# Patient Record
Sex: MALE | Race: WHITE | NOT HISPANIC OR LATINO | Employment: FULL TIME | URBAN - METROPOLITAN AREA
[De-identification: names, ages, dates, MRNs, and addresses within clinical notes are randomized per-mention and may not be internally consistent; named-entity substitution may affect disease eponyms.]

---

## 2019-08-21 ENCOUNTER — OFFICE VISIT (OUTPATIENT)
Dept: FAMILY MEDICINE | Facility: CLINIC | Age: 29
End: 2019-08-21
Payer: COMMERCIAL

## 2019-08-21 VITALS
WEIGHT: 196 LBS | HEIGHT: 72 IN | SYSTOLIC BLOOD PRESSURE: 120 MMHG | HEART RATE: 68 BPM | BODY MASS INDEX: 26.55 KG/M2 | OXYGEN SATURATION: 99 % | DIASTOLIC BLOOD PRESSURE: 70 MMHG | TEMPERATURE: 96 F

## 2019-08-21 DIAGNOSIS — J20.9 ACUTE BRONCHITIS WITH SYMPTOMS > 10 DAYS: Primary | ICD-10-CM

## 2019-08-21 PROCEDURE — 99203 OFFICE O/P NEW LOW 30 MIN: CPT | Performed by: NURSE PRACTITIONER

## 2019-08-21 RX ORDER — AZITHROMYCIN 250 MG/1
TABLET, FILM COATED ORAL
Qty: 6 TABLET | Refills: 0 | Status: SHIPPED | OUTPATIENT
Start: 2019-08-21 | End: 2019-08-26

## 2019-08-21 ASSESSMENT — MIFFLIN-ST. JEOR: SCORE: 1907.05

## 2019-08-21 NOTE — PROGRESS NOTES
Subjective     Guicho Baird is a 28 year old male who presents to clinic today for the following health issues:    HPI   Acute Illness   Acute illness concerns: cough, congestion   Onset:  3 weeks     Fever: YES- reported unknown     Chills/Sweats: YES- one day     Headache (location?): no    Sinus Pressure:no    Conjunctivitis:  no    Ear Pain: no    Rhinorrhea: YES    Congestion: YES    Sore Throat: no     Cough: YES-productive of green sputum    Wheeze: YES    Decreased Appetite: no    Nausea: no    Vomiting: no    Diarrhea:  no    Dysuria/Freq.: no    Fatigue/Achiness: no    Sick/Strep Exposure: no     Therapies Tried and outcome: sudafed, tylenol, and a decongestant - somewhat helpful     HPI: Guicho presents today with the complaint of ongoing productive cough (3.5 weeks). Intermittent fevers over 36 hours. Had gotten better for a few days, but, yesterday, his symptoms returned and intensified. Endorses productive cough (green sputum) and mild wheeze. Denies body aches, shortness of breath, nausea, vomiting, diarrhea, rash. OTC remedies not helpful.     There is no problem list on file for this patient.    History reviewed. No pertinent surgical history.    Social History     Tobacco Use     Smoking status: Never Smoker     Smokeless tobacco: Never Used   Substance Use Topics     Alcohol use: Yes     Family History   Problem Relation Age of Onset     Kawasaki disease Sister      Arrhythmia Brother      Dementia Paternal Grandmother      Cancer Paternal Grandfather            Reviewed and updated as needed this visit by Provider  Tobacco  Med Hx  Surg Hx  Fam Hx  Soc Hx        Review of Systems   ROS COMP: Constitutional, HEENT, pulmonary, GI, musculoskeletal, skin systems are negative, except as otherwise noted.      Objective    /70 (BP Location: Left arm, Patient Position: Chair, Cuff Size: Adult Regular)   Pulse 68   Temp 96  F (35.6  C) (Tympanic)   Ht 1.829 m (6')   Wt 88.9 kg (196  lb)   SpO2 99%   BMI 26.58 kg/m    Body mass index is 26.58 kg/m .  Physical Exam   GENERAL: healthy, alert and no distress  HENT: ear canals and TM's normal, nose and mouth without ulcers or lesions  NECK: no adenopathy, no asymmetry, masses, or scars and thyroid normal to palpation  RESP: Moist cough; Coarse rhonchi noted throughout (cleared with cough); No evidence of focal consolidation; Chest excursion, respiratory rate, and ventilatory effort / ease within normal limits. No indicators of respiratory distress.  CV: regular rate and rhythm, normal S1 S2, no S3 or S4, no murmur, click or rub, no peripheral edema and peripheral pulses strong  NEURO: Normal strength and tone, mentation intact and speech normal    Diagnostic Test Results:  none         Assessment & Plan     Guicho was seen today for cough.    Diagnoses and all orders for this visit:    Acute bronchitis with symptoms > 10 days  Comment: Reasonable to treat given duration of symptoms, double sickening pattern, and productive nature of cough (green sputum). Azithromycin for 5 days. Symptomatic cares and follow up precautions discussed in detail. Guicho acknowledges and demonstrates understanding of circumstances under which care should be sought urgently or emergently. Follow up as discussed. Discussed risks, benefits, alternatives, potential side effects, and proper administration of new medication / treatment. Agrees with plan of care. All questions answered.     -     azithromycin (ZITHROMAX) 250 MG tablet; Take 2 tablets (500 mg) by mouth daily for 1 day, THEN 1 tablet (250 mg) daily for 4 days.         BMI:   Estimated body mass index is 26.58 kg/m  as calculated from the following:    Height as of this encounter: 1.829 m (6').    Weight as of this encounter: 88.9 kg (196 lb).   Weight management plan: Discussed healthy diet and exercise guidelines        See Patient Instructions    Return in about 1 week (around 8/28/2019) for persistent or  worsening symptoms.    Bulmaro Marshall NP  [facility] [place]

## 2019-08-21 NOTE — PATIENT INSTRUCTIONS
RESPIRATORY INFECTION SUPPORTIVE CARES:  Stay hydrated (even if you're not thirsty)  Over-the-counter decongestants (phenylephrine or Sudafed) for sinus and nasal congestion  Guaifenesin (Mucinex) to thin secretions  Warm compresses over sinuses, or allow warm shower water to run down face  Neti-Pot sinus / nasal rinse  Flonase or other intranasal steroid MAY help  Humidifier at night  Tylenol or ibuprofen for fever and aches & pains  Salt water gargles, Chloraseptic spray, or Cepacol lozenges for sore throat  Over-the-counter cough suppressants, cough drops, or honey for cough  Call or return to the clinic if you are getting worse or not getting better in the coming days  Go to the Emergency Room if you have trouble breathing or if your fever gets really high

## 2021-05-31 ENCOUNTER — RECORDS - HEALTHEAST (OUTPATIENT)
Dept: ADMINISTRATIVE | Facility: CLINIC | Age: 31
End: 2021-05-31

## 2022-11-29 ENCOUNTER — OFFICE VISIT (OUTPATIENT)
Dept: FAMILY MEDICINE | Facility: CLINIC | Age: 32
End: 2022-11-29
Payer: COMMERCIAL

## 2022-11-29 VITALS
SYSTOLIC BLOOD PRESSURE: 130 MMHG | BODY MASS INDEX: 32.1 KG/M2 | HEART RATE: 81 BPM | TEMPERATURE: 97 F | OXYGEN SATURATION: 100 % | HEIGHT: 72 IN | WEIGHT: 237 LBS | DIASTOLIC BLOOD PRESSURE: 86 MMHG

## 2022-11-29 DIAGNOSIS — E66.811 CLASS 1 OBESITY DUE TO EXCESS CALORIES WITHOUT SERIOUS COMORBIDITY WITH BODY MASS INDEX (BMI) OF 32.0 TO 32.9 IN ADULT: ICD-10-CM

## 2022-11-29 DIAGNOSIS — Z13.29 SCREENING FOR THYROID DISORDER: ICD-10-CM

## 2022-11-29 DIAGNOSIS — E66.09 CLASS 1 OBESITY DUE TO EXCESS CALORIES WITHOUT SERIOUS COMORBIDITY WITH BODY MASS INDEX (BMI) OF 32.0 TO 32.9 IN ADULT: ICD-10-CM

## 2022-11-29 DIAGNOSIS — Z13.220 SCREENING FOR HYPERLIPIDEMIA: ICD-10-CM

## 2022-11-29 DIAGNOSIS — Z00.00 ROUTINE GENERAL MEDICAL EXAMINATION AT A HEALTH CARE FACILITY: Primary | ICD-10-CM

## 2022-11-29 DIAGNOSIS — Z13.1 SCREENING FOR DIABETES MELLITUS: ICD-10-CM

## 2022-11-29 DIAGNOSIS — Z13.0 SCREENING FOR DEFICIENCY ANEMIA: ICD-10-CM

## 2022-11-29 LAB
ALBUMIN SERPL BCG-MCNC: 4.6 G/DL (ref 3.5–5.2)
ALP SERPL-CCNC: 55 U/L (ref 40–129)
ALT SERPL W P-5'-P-CCNC: 53 U/L (ref 10–50)
ANION GAP SERPL CALCULATED.3IONS-SCNC: 14 MMOL/L (ref 7–15)
AST SERPL W P-5'-P-CCNC: 46 U/L (ref 10–50)
BILIRUB SERPL-MCNC: 0.6 MG/DL
BUN SERPL-MCNC: 12.9 MG/DL (ref 6–20)
CALCIUM SERPL-MCNC: 9 MG/DL (ref 8.6–10)
CHLORIDE SERPL-SCNC: 102 MMOL/L (ref 98–107)
CHOLEST SERPL-MCNC: 224 MG/DL
CREAT SERPL-MCNC: 1.06 MG/DL (ref 0.67–1.17)
DEPRECATED HCO3 PLAS-SCNC: 25 MMOL/L (ref 22–29)
ERYTHROCYTE [DISTWIDTH] IN BLOOD BY AUTOMATED COUNT: 12.7 % (ref 10–15)
GFR SERPL CREATININE-BSD FRML MDRD: >90 ML/MIN/1.73M2
GLUCOSE SERPL-MCNC: 91 MG/DL (ref 70–99)
HCT VFR BLD AUTO: 43.7 % (ref 40–53)
HDLC SERPL-MCNC: 48 MG/DL
HGB BLD-MCNC: 15.3 G/DL (ref 13.3–17.7)
LDLC SERPL CALC-MCNC: 138 MG/DL
MCH RBC QN AUTO: 29.5 PG (ref 26.5–33)
MCHC RBC AUTO-ENTMCNC: 35 G/DL (ref 31.5–36.5)
MCV RBC AUTO: 84 FL (ref 78–100)
NONHDLC SERPL-MCNC: 176 MG/DL
PLATELET # BLD AUTO: 189 10E3/UL (ref 150–450)
POTASSIUM SERPL-SCNC: 4 MMOL/L (ref 3.4–5.3)
PROT SERPL-MCNC: 7.2 G/DL (ref 6.4–8.3)
RBC # BLD AUTO: 5.18 10E6/UL (ref 4.4–5.9)
SODIUM SERPL-SCNC: 141 MMOL/L (ref 136–145)
TRIGL SERPL-MCNC: 188 MG/DL
WBC # BLD AUTO: 4.8 10E3/UL (ref 4–11)

## 2022-11-29 PROCEDURE — 36415 COLL VENOUS BLD VENIPUNCTURE: CPT | Performed by: NURSE PRACTITIONER

## 2022-11-29 PROCEDURE — 84443 ASSAY THYROID STIM HORMONE: CPT | Performed by: NURSE PRACTITIONER

## 2022-11-29 PROCEDURE — 80061 LIPID PANEL: CPT | Performed by: NURSE PRACTITIONER

## 2022-11-29 PROCEDURE — 0124A COVID-19 VACCINE BIVALENT BOOSTER 12+ (PFIZER): CPT | Performed by: NURSE PRACTITIONER

## 2022-11-29 PROCEDURE — 85027 COMPLETE CBC AUTOMATED: CPT | Performed by: NURSE PRACTITIONER

## 2022-11-29 PROCEDURE — 91312 COVID-19 VACCINE BIVALENT BOOSTER 12+ (PFIZER): CPT | Performed by: NURSE PRACTITIONER

## 2022-11-29 PROCEDURE — 80053 COMPREHEN METABOLIC PANEL: CPT | Performed by: NURSE PRACTITIONER

## 2022-11-29 PROCEDURE — 99385 PREV VISIT NEW AGE 18-39: CPT | Performed by: NURSE PRACTITIONER

## 2022-11-29 ASSESSMENT — ENCOUNTER SYMPTOMS
DYSURIA: 0
MYALGIAS: 0
NERVOUS/ANXIOUS: 0
CONSTIPATION: 0
NAUSEA: 0
ARTHRALGIAS: 0
JOINT SWELLING: 0
CHILLS: 0
SHORTNESS OF BREATH: 0
SORE THROAT: 0
DIZZINESS: 0
HEADACHES: 0
HEMATURIA: 0
WEAKNESS: 0
HEMATOCHEZIA: 0
DIARRHEA: 0
ABDOMINAL PAIN: 0
FEVER: 0
HEARTBURN: 0
COUGH: 0
PALPITATIONS: 0
EYE PAIN: 0
FREQUENCY: 0
PARESTHESIAS: 0

## 2022-11-29 NOTE — PROGRESS NOTES
SUBJECTIVE:   CC: Sanjeev is an 32 year old who presents for preventative health visit.     Patient has been advised of split billing requirements and indicates understanding: Yes  Healthy Habits:     Getting at least 3 servings of Calcium per day:  Yes    Bi-annual eye exam:  Yes    Dental care twice a year:  Yes    Sleep apnea or symptoms of sleep apnea:  None    Diet:  Regular (no restrictions) and Breakfast skipped    Frequency of exercise:  2-3 days/week    Duration of exercise:  30-45 minutes    Taking medications regularly:  Yes    Medication side effects:  None    PHQ-2 Total Score: 0    Additional concerns today:  Yes    Had lab/ biometric screening at his work.  Previously weighed 270 pounds in college. Worked out and was able to get weight down to 190 pounds.    Recently  this year reports decrease in his physical activity and some overeating.  Due to BMI 32 and  he was denied money from his work towards his insurance.  He has paperwork with him today to appeal this to help him get more financial support from his work towards his insurance.    Has an overall goal of healthier eating and weight loss.  Does report intermittent fasting tries to eat last meal before 8 PM and not eat until noonish next day.  Labs need repeated today.     Wt Readings from Last 5 Encounters:   11/29/22 107.5 kg (237 lb)   08/21/19 88.9 kg (196 lb)     Today's PHQ-2 Score:   PHQ-2 ( 1999 Pf[employer]) 11/29/2022   Q1: Little interest or pleasure in doing things 0   Q2: Feeling down, depressed or hopeless 0   PHQ-2 Score 0   PHQ-2 Total Score (12-17 Years)- Positive if 3 or more points; Administer PHQ-A if positive -   Q1: Little interest or pleasure in doing things Not at all   Q2: Feeling down, depressed or hopeless Not at all   PHQ-2 Score 0     Have you ever done Advance Care Planning? (For example, a Health Directive, POLST, or a discussion with a medical provider or your loved ones about your wishes): No, advance care  planning information given to patient to review.  Patient plans to discuss their wishes with loved ones or provider.      Social History     Tobacco Use     Smoking status: Never     Smokeless tobacco: Never   Substance Use Topics     Alcohol use: Yes     Alcohol Use 11/29/2022   Prescreen: >3 drinks/day or >7 drinks/week? No     Last PSA: No results found for: PSA    Reviewed orders with patient. Reviewed health maintenance and updated orders accordingly - Yes  Lab work is in process  Labs reviewed in EPIC  BP Readings from Last 3 Encounters:   11/29/22 130/86   08/21/19 120/70    Wt Readings from Last 3 Encounters:   11/29/22 107.5 kg (237 lb)   08/21/19 88.9 kg (196 lb)           There is no problem list on file for this patient.    History reviewed. No pertinent surgical history.    Social History     Tobacco Use     Smoking status: Never     Smokeless tobacco: Never   Substance Use Topics     Alcohol use: Yes     Family History   Problem Relation Age of Onset     No Known Problems Mother      Hyperlipidemia Father      Hypertension Father      Kawasaki disease Sister      Hypertension Brother      Arrhythmia Brother      Heart Defect Brother      Pacemaker Brother      No Known Problems Brother      Cancer Maternal Grandfather      Dementia Paternal Grandmother      Cancer Paternal Grandfather          No current outpatient medications on file.     No Known Allergies    Reviewed and updated as needed this visit by clinical staff   Tobacco  Allergies  Meds  Problems  Med Hx  Surg Hx  Fam Hx          Reviewed and updated as needed this visit by Provider   Tobacco  Allergies  Meds  Problems  Med Hx  Surg Hx  Fam Hx           Review of Systems   Constitutional: Negative for chills and fever.   HENT: Negative for congestion, ear pain, hearing loss and sore throat.    Eyes: Negative for pain and visual disturbance.   Respiratory: Negative for cough and shortness of breath.    Cardiovascular: Negative  for chest pain, palpitations and peripheral edema.   Gastrointestinal: Negative for abdominal pain, constipation, diarrhea, heartburn, hematochezia and nausea.   Genitourinary: Negative for dysuria, frequency, genital sores, hematuria, impotence, penile discharge and urgency.   Musculoskeletal: Negative for arthralgias, joint swelling and myalgias.   Skin: Negative for rash.   Neurological: Negative for dizziness, weakness, headaches and paresthesias.   Psychiatric/Behavioral: Negative for mood changes. The patient is not nervous/anxious.      OBJECTIVE:   /86   Pulse 81   Temp 97  F (36.1  C)   Ht 1.829 m (6')   Wt 107.5 kg (237 lb)   SpO2 100%   BMI 32.14 kg/m    Waist 42 inches.    Physical Exam  GENERAL: healthy, alert and no distress  EYES: Eyes grossly normal to inspection, PERRL and conjunctivae and sclerae normal  HENT: ear canals and TM's normal, nose and mouth without ulcers or lesions  NECK: no adenopathy, no asymmetry, masses, or scars and thyroid normal to palpation  RESP: lungs clear to auscultation - no rales, rhonchi or wheezes  CV: regular rate and rhythm, normal S1 S2, no S3 or S4, no murmur, click or rub, no peripheral edema and peripheral pulses strong  ABDOMEN: soft, nontender, no hepatosplenomegaly, no masses and bowel sounds normal  MS: no gross musculoskeletal defects noted, no edema  SKIN: no suspicious lesions or rashes  NEURO: Normal strength and tone, mentation intact and speech normal  PSYCH: mentation appears normal, affect normal/bright    ASSESSMENT/PLAN:   Guicho was seen today for physical.    Diagnoses and all orders for this visit:    Routine general medical examination at a health care facility  Wellness exam completed today.    Fasting labs today.    Will notify of lab results.  -     REVIEW OF HEALTH MAINTENANCE PROTOCOL ORDERS    Class 1 obesity due to excess calories without serious comorbidity with body mass index (BMI) of 32.0 to 32.9 in adult  He has  realistic goals for his health and weight loss.  Will complete labs today.  Will work on weight loss throughout the year encourage follow-up in approximately 6 months for repeat weight check and waist measurement.  Happy to appeal his work for better insurance coverage to help him to achieve his goals to improving his health.    Screening for deficiency anemia  -     CBC with platelets; Future  -     CBC with platelets    Screening for thyroid disorder  -     Comprehensive metabolic panel (BMP + Alb, Alk Phos, ALT, AST, Total. Bili, TP); Future  -     TSH with free T4 reflex; Future  -     Comprehensive metabolic panel (BMP + Alb, Alk Phos, ALT, AST, Total. Bili, TP)  -     TSH with free T4 reflex    Screening for diabetes mellitus  -     Comprehensive metabolic panel (BMP + Alb, Alk Phos, ALT, AST, Total. Bili, TP); Future  -     Comprehensive metabolic panel (BMP + Alb, Alk Phos, ALT, AST, Total. Bili, TP)    Screening for hyperlipidemia  -     Lipid panel reflex to direct LDL Fasting; Future  -     Lipid panel reflex to direct LDL Fasting    Other orders  -     COVID-19,PF,PFIZER BOOSTER BIVALENT (12+YRS)      Patient has been advised of split billing requirements and indicates understanding: Yes      COUNSELING:   Reviewed preventive health counseling, as reflected in patient instructions    He reports that he has never smoked. He has never used smokeless tobacco.              CHAS Goodwin CNP  [facility] [place]

## 2022-11-30 LAB — TSH SERPL DL<=0.005 MIU/L-ACNC: 1.78 UIU/ML (ref 0.3–4.2)

## 2022-12-07 NOTE — PROGRESS NOTES
Form has been faxed and copied and sent to scan.  Mailed original to pt.    Informed pt this info.    Cornel SOUZA CMA

## 2022-12-07 NOTE — PROGRESS NOTES
[employer] appeal form completed please fax to  number provided on the form.      Please also mail patient a copy and scanned in his chart.  Please, make him aware form has been completed.        Calista Olivares, GREGP-BC

## 2023-02-06 ENCOUNTER — OFFICE VISIT (OUTPATIENT)
Dept: FAMILY MEDICINE | Facility: CLINIC | Age: 33
End: 2023-02-06
Payer: COMMERCIAL

## 2023-02-06 DIAGNOSIS — E66.811 CLASS 1 OBESITY DUE TO EXCESS CALORIES WITHOUT SERIOUS COMORBIDITY WITH BODY MASS INDEX (BMI) OF 32.0 TO 32.9 IN ADULT: Primary | ICD-10-CM

## 2023-02-06 DIAGNOSIS — E66.09 CLASS 1 OBESITY DUE TO EXCESS CALORIES WITHOUT SERIOUS COMORBIDITY WITH BODY MASS INDEX (BMI) OF 32.0 TO 32.9 IN ADULT: Primary | ICD-10-CM

## 2023-02-06 PROCEDURE — 99207 PR NO CHARGE LOS: CPT | Performed by: NURSE PRACTITIONER

## 2023-02-06 NOTE — PROGRESS NOTES
This was a weight check only not needed to be a full visit.  He is to being a specific wait for insurance to cover him better on his insurance premium.  Has done well and lost almost 10 pounds.  They need him to be at that 10 pound valencia.  He is 2 pounds shy of this today.  Encouraged him to return on Friday for nurse only weight check and I can fill out his paperwork for him.        No charge for today's visit.        Calista Olivares, FNP-BC

## 2023-02-10 ENCOUNTER — ALLIED HEALTH/NURSE VISIT (OUTPATIENT)
Dept: FAMILY MEDICINE | Facility: CLINIC | Age: 33
End: 2023-02-10
Payer: COMMERCIAL

## 2023-02-10 ENCOUNTER — TELEPHONE (OUTPATIENT)
Dept: FAMILY MEDICINE | Facility: CLINIC | Age: 33
End: 2023-02-10

## 2023-02-10 VITALS — WEIGHT: 226 LBS | BODY MASS INDEX: 30.65 KG/M2

## 2023-02-10 DIAGNOSIS — R63.4 WEIGHT LOSS: Primary | ICD-10-CM

## 2023-02-10 PROCEDURE — 99207 PR NO CHARGE NURSE ONLY: CPT

## 2023-02-10 NOTE — TELEPHONE ENCOUNTER
Copy of letter sent to patient's MyChart.  Copy printed with his new weight to be faxed to [employer] appeal committee.        Calista Olivares, GREGP-BC

## 2023-02-10 NOTE — TELEPHONE ENCOUNTER
Reason for Call:  Form, our goal is to have forms completed with 72 hours, however, some forms may require a visit or additional information.    Type of letter, form or note:  [employer] Appeal Form    Who is the form from?: Healthcare Provider - [employer] (if other please explain)    Where did the form come from: Patient or family brought in       What clinic location was the form placed at?: [facility]    Where the form was placed: Given to physician - in office in folder    What number is listed as a contact on the form?: [phone] cell       Additional comments: call pt when letter and form has been faxed  FAX TO - [phone] --- send to scan after    Call taken on 2/10/2023 at 8:50 AM by Cornel Montenegro CMA

## 2023-02-10 NOTE — LETTER
February 10, 2023      Guicho Baird  [id number] [place] [address]    To Whom It May Concern:    Guicho Baird was seen in our clinic 2/10/23. He has a current weight of 226 pounds which is down 11 pounds. He has done an excellent job working on his goal of weight loss.     Wt Readings from Last 5 Encounters:   02/10/23 102.5 kg (226 lb)   11/29/22 107.5 kg (237 lb)   08/21/19 88.9 kg (196 lb)       Sincerely,          CHAS Goodwin CNP

## 2023-03-22 NOTE — TELEPHONE ENCOUNTER
Pt called and stated that [employer] did not ever receive that documentation required. Pt is requesting that the letter & the weight documentation be re-faxed and printed for the pt and mailed to address on file.    Pt call back: [phone] Detailed [employer]m EDITH Flanagan

## 2023-03-22 NOTE — TELEPHONE ENCOUNTER
Printed, faxed ([phone]) and copy of letter mailed to address on file. Jessica Galindo CMA

## 2023-11-30 ASSESSMENT — ENCOUNTER SYMPTOMS
NAUSEA: 0
DIZZINESS: 0
ABDOMINAL PAIN: 0
CONSTIPATION: 0
CHILLS: 0
COUGH: 0
MYALGIAS: 0
FEVER: 0
HEMATOCHEZIA: 0
DYSURIA: 0
DIARRHEA: 0
FREQUENCY: 0
WEAKNESS: 0
PALPITATIONS: 0
PARESTHESIAS: 0
ARTHRALGIAS: 0
HEARTBURN: 0
SHORTNESS OF BREATH: 0
HEMATURIA: 0
JOINT SWELLING: 0
HEADACHES: 0
SORE THROAT: 0
EYE PAIN: 0
NERVOUS/ANXIOUS: 0

## 2023-12-01 ENCOUNTER — OFFICE VISIT (OUTPATIENT)
Dept: FAMILY MEDICINE | Facility: CLINIC | Age: 33
End: 2023-12-01
Payer: COMMERCIAL

## 2023-12-01 VITALS
TEMPERATURE: 97.3 F | WEIGHT: 227 LBS | BODY MASS INDEX: 30.75 KG/M2 | OXYGEN SATURATION: 100 % | HEART RATE: 71 BPM | SYSTOLIC BLOOD PRESSURE: 127 MMHG | HEIGHT: 72 IN | RESPIRATION RATE: 11 BRPM | DIASTOLIC BLOOD PRESSURE: 70 MMHG

## 2023-12-01 DIAGNOSIS — Z00.00 ROUTINE GENERAL MEDICAL EXAMINATION AT A HEALTH CARE FACILITY: Primary | ICD-10-CM

## 2023-12-01 DIAGNOSIS — E78.5 HYPERLIPIDEMIA WITH TARGET LDL LESS THAN 160: ICD-10-CM

## 2023-12-01 PROCEDURE — 36415 COLL VENOUS BLD VENIPUNCTURE: CPT | Performed by: NURSE PRACTITIONER

## 2023-12-01 PROCEDURE — 80061 LIPID PANEL: CPT | Performed by: NURSE PRACTITIONER

## 2023-12-01 PROCEDURE — 84443 ASSAY THYROID STIM HORMONE: CPT | Performed by: NURSE PRACTITIONER

## 2023-12-01 PROCEDURE — 99395 PREV VISIT EST AGE 18-39: CPT | Performed by: NURSE PRACTITIONER

## 2023-12-01 ASSESSMENT — ENCOUNTER SYMPTOMS
ARTHRALGIAS: 0
HEADACHES: 0
CHILLS: 0
CONSTIPATION: 0
FEVER: 0
HEMATURIA: 0
JOINT SWELLING: 0
COUGH: 0
SHORTNESS OF BREATH: 0
HEARTBURN: 0
DIARRHEA: 0
EYE PAIN: 0
FREQUENCY: 0
SORE THROAT: 0
HEMATOCHEZIA: 0
NERVOUS/ANXIOUS: 0
NAUSEA: 0
MYALGIAS: 0
PARESTHESIAS: 0
PALPITATIONS: 0
DYSURIA: 0
ABDOMINAL PAIN: 0
DIZZINESS: 0
WEAKNESS: 0

## 2023-12-01 NOTE — PROGRESS NOTES
SUBJECTIVE:   Sanjeev is a 33 year old, presenting for the following:  Physical        12/1/2023    10:47 AM   Additional Questions   Roomed by Cornel CMA   Accompanied by Self       Healthy Habits:     Getting at least 3 servings of Calcium per day:  Yes    Bi-annual eye exam:  NO    Dental care twice a year:  Yes    Sleep apnea or symptoms of sleep apnea:  None    Diet:  Breakfast skipped    Frequency of exercise:  4-5 days/week    Duration of exercise:  30-45 minutes    Taking medications regularly:  Yes    Medication side effects:  Not applicable and None    Additional concerns today:  No    Biometric screening form to complete after lab results    Triglycerides.  LDL     Fish oil  Recheck annuallly.     Wt Readings from Last 5 Encounters:   12/01/23 103 kg (227 lb)   02/10/23 102.5 kg (226 lb)   11/29/22 107.5 kg (237 lb)   08/21/19 88.9 kg (196 lb)            Social History     Tobacco Use     Smoking status: Never     Smokeless tobacco: Never   Substance Use Topics     Alcohol use: Yes         11/30/2023     7:17 PM   Alcohol Use   Prescreen: >3 drinks/day or >7 drinks/week? Yes   AUDIT SCORE  7         11/30/2023     7:17 PM   AUDIT - Alcohol Use Disorders Identification Test - Reproduced from the World Health Organization Audit 2001 (Second Edition)   1.  How often do you have a drink containing alcohol? 2 to 3 times a week   2.  How many drinks containing alcohol do you have on a typical day when you are drinking? 3 or 4   3.  How often do you have five or more drinks on one occasion? Less than monthly   4.  How often during the last year have you found that you were not able to stop drinking once you had started? Never   5.  How often during the last year have you failed to do what was normally expected of you because of drinking? Never   6.  How often during the last year have you needed a first drink in the morning to get yourself going after a heavy drinking session? Never   7.  How often during the last  "year have you had a feeling of guilt or remorse after drinking? Less than monthly   8.  How often during the last year have you been unable to remember what happened the night before because of your drinking? Less than monthly   9.  Have you or someone else been injured because of your drinking? No   10. Has a relative, friend, doctor or other health care worker been concerned about your drinking or suggested you cut down? No   TOTAL SCORE 7       Last PSA: No results found for: \"PSA\"    Reviewed orders with patient. Reviewed health maintenance and updated orders accordingly - Yes  Lab work is in process  Labs reviewed in EPIC  BP Readings from Last 3 Encounters:   12/01/23 127/70   11/29/22 130/86   08/21/19 120/70    Wt Readings from Last 3 Encounters:   12/01/23 103 kg (227 lb)   02/10/23 102.5 kg (226 lb)   11/29/22 107.5 kg (237 lb)                  There is no problem list on file for this patient.    History reviewed. No pertinent surgical history.    Social History     Tobacco Use     Smoking status: Never     Smokeless tobacco: Never   Substance Use Topics     Alcohol use: Yes     Family History   Problem Relation Age of Onset     No Known Problems Mother      Hyperlipidemia Father      Hypertension Father      Kawasaki disease Sister      Hypertension Brother      Arrhythmia Brother      Heart Defect Brother      Pacemaker Brother      No Known Problems Brother      Cancer Maternal Grandfather      Dementia Paternal Grandmother      Cancer Paternal Grandfather          No current outpatient medications on file.     No Known Allergies    Reviewed and updated as needed this visit by clinical staff   Tobacco  Allergies  Meds  Problems  Med Hx  Surg Hx  Fam Hx          Reviewed and updated as needed this visit by Provider   Tobacco  Allergies  Meds  Problems  Med Hx  Surg Hx  Fam Hx           Review of Systems   Constitutional:  Negative for chills and fever.   HENT:  Negative for congestion, ear " "pain, hearing loss and sore throat.    Eyes:  Negative for pain and visual disturbance.   Respiratory:  Negative for cough and shortness of breath.    Cardiovascular:  Negative for chest pain, palpitations and peripheral edema.   Gastrointestinal:  Negative for abdominal pain, constipation, diarrhea, heartburn, hematochezia and nausea.   Genitourinary:  Negative for dysuria, frequency, genital sores, hematuria, impotence, penile discharge and urgency.   Musculoskeletal:  Negative for arthralgias, joint swelling and myalgias.   Skin:  Negative for rash.   Neurological:  Negative for dizziness, weakness, headaches and paresthesias.   Psychiatric/Behavioral:  Negative for mood changes. The patient is not nervous/anxious.      [facility]        OBJECTIVE:   /70 (BP Location: Right arm, Patient Position: Chair, Cuff Size: Adult Large)   Pulse 71   Temp 97.3  F (36.3  C) (Tympanic)   Resp 11   Ht 1.84 m (6' 0.44\")   Wt 103 kg (227 lb)   SpO2 100%   BMI 30.41 kg/m      Physical Exam  GENERAL: healthy, alert and no distress  EYES: Eyes grossly normal to inspection, PERRL and conjunctivae and sclerae normal  HENT: ear canals and TM's normal, nose and mouth without ulcers or lesions  NECK: no adenopathy, no asymmetry, masses, or scars and thyroid normal to palpation  RESP: lungs clear to auscultation - no rales, rhonchi or wheezes  CV: regular rate and rhythm, normal S1 S2, no S3 or S4, no murmur, click or rub, no peripheral edema and peripheral pulses strong  ABDOMEN: soft, nontender, no hepatosplenomegaly, no masses and bowel sounds normal  MS: no gross musculoskeletal defects noted, no edema  SKIN: no suspicious lesions or rashes  NEURO: Normal strength and tone, mentation intact and speech normal  PSYCH: mentation appears normal, affect normal/bright    Diagnostic Test Results:  Labs reviewed in Epic    ASSESSMENT/PLAN:   Sanjeev was seen today for physical.    Diagnoses and all orders for this " visit:    Routine general medical examination at a health care facility  Wellness exam completed today.    Fasting labs today.    Will notify of lab results.  -     Lipid panel reflex to direct LDL Fasting; Future  -     TSH with free T4 reflex; Future  -     Lipid panel reflex to direct LDL Fasting  -     TSH with free T4 reflex    Hyperlipidemia with target LDL less than 160    Other orders  -     Cancel: COVID-19 12+ (2023-24) (PFIZER)  -     REVIEW OF HEALTH MAINTENANCE PROTOCOL ORDERS  -     PRIMARY CARE FOLLOW-UP SCHEDULING; Future        Patient has been advised of split billing requirements and indicates understanding: Yes      COUNSELING:   Reviewed preventive health counseling, as reflected in patient instructions        He reports that he has never smoked. He has never used smokeless tobacco.             CHAS Goodwin [employer] [place]

## 2023-12-02 LAB
CHOLEST SERPL-MCNC: 206 MG/DL
HDLC SERPL-MCNC: 46 MG/DL
LDLC SERPL CALC-MCNC: 129 MG/DL
NONHDLC SERPL-MCNC: 160 MG/DL
TRIGL SERPL-MCNC: 155 MG/DL
TSH SERPL DL<=0.005 MIU/L-ACNC: 1.87 UIU/ML (ref 0.3–4.2)

## 2023-12-12 NOTE — RESULT ENCOUNTER NOTE
Dear Sanjeev,    Here is a summary of your recent test results:    -LDL(bad) cholesterol level is elevated, and your triglycerides are elevated which can increase your heart disease risk.  However they are improved from last year. A diet high in fat and simple carbohydrates, genetics and being overweight can contribute to this. ADVISE: exercising eating a low saturated fat/low carbohydrate diet, and omega-3 fatty acids (fish oil) 9000-1832 mg daily are helpful to improve this.      COVID-19 Vaccine(5 - 2023-24 season) due on 09/01/2023      Please call us at [phone] (or use [facility]) to address the above recommendations if needed.    Thank you for choosing [facility].  It was an honor and a privilege to participate in your care.       Healthy regards,    Calista Olivares, ALY  [facility]

## 2024-10-15 ENCOUNTER — TRANSFERRED RECORDS (OUTPATIENT)
Dept: HEALTH INFORMATION MANAGEMENT | Facility: CLINIC | Age: 34
End: 2024-10-15

## 2024-11-30 SDOH — HEALTH STABILITY: PHYSICAL HEALTH: ON AVERAGE, HOW MANY MINUTES DO YOU ENGAGE IN EXERCISE AT THIS LEVEL?: 40 MIN

## 2024-11-30 SDOH — HEALTH STABILITY: PHYSICAL HEALTH: ON AVERAGE, HOW MANY DAYS PER WEEK DO YOU ENGAGE IN MODERATE TO STRENUOUS EXERCISE (LIKE A BRISK WALK)?: 3 DAYS

## 2024-11-30 ASSESSMENT — SOCIAL DETERMINANTS OF HEALTH (SDOH): HOW OFTEN DO YOU GET TOGETHER WITH FRIENDS OR RELATIVES?: TWICE A WEEK

## 2024-12-03 ENCOUNTER — OFFICE VISIT (OUTPATIENT)
Dept: FAMILY MEDICINE | Facility: CLINIC | Age: 34
End: 2024-12-03
Attending: NURSE PRACTITIONER
Payer: COMMERCIAL

## 2024-12-03 VITALS
HEIGHT: 73 IN | HEART RATE: 60 BPM | RESPIRATION RATE: 14 BRPM | WEIGHT: 238 LBS | BODY MASS INDEX: 31.54 KG/M2 | TEMPERATURE: 97.4 F | DIASTOLIC BLOOD PRESSURE: 72 MMHG | OXYGEN SATURATION: 96 % | SYSTOLIC BLOOD PRESSURE: 110 MMHG

## 2024-12-03 DIAGNOSIS — E78.5 HYPERLIPIDEMIA WITH TARGET LDL LESS THAN 160: ICD-10-CM

## 2024-12-03 DIAGNOSIS — E78.1 HYPERTRIGLYCERIDEMIA: ICD-10-CM

## 2024-12-03 DIAGNOSIS — Z13.29 SCREENING FOR THYROID DISORDER: ICD-10-CM

## 2024-12-03 DIAGNOSIS — E66.09 CLASS 1 OBESITY DUE TO EXCESS CALORIES WITH SERIOUS COMORBIDITY AND BODY MASS INDEX (BMI) OF 31.0 TO 31.9 IN ADULT: ICD-10-CM

## 2024-12-03 DIAGNOSIS — Z00.00 ROUTINE GENERAL MEDICAL EXAMINATION AT A HEALTH CARE FACILITY: Primary | ICD-10-CM

## 2024-12-03 DIAGNOSIS — E66.811 CLASS 1 OBESITY DUE TO EXCESS CALORIES WITH SERIOUS COMORBIDITY AND BODY MASS INDEX (BMI) OF 31.0 TO 31.9 IN ADULT: ICD-10-CM

## 2024-12-03 DIAGNOSIS — Z23 NEED FOR COVID-19 VACCINE: ICD-10-CM

## 2024-12-03 PROCEDURE — 99395 PREV VISIT EST AGE 18-39: CPT | Performed by: NURSE PRACTITIONER

## 2024-12-03 PROCEDURE — 84443 ASSAY THYROID STIM HORMONE: CPT | Performed by: NURSE PRACTITIONER

## 2024-12-03 PROCEDURE — 91320 SARSCV2 VAC 30MCG TRS-SUC IM: CPT | Performed by: NURSE PRACTITIONER

## 2024-12-03 PROCEDURE — 90480 ADMN SARSCOV2 VAC 1/ONLY CMP: CPT | Performed by: NURSE PRACTITIONER

## 2024-12-03 PROCEDURE — 36415 COLL VENOUS BLD VENIPUNCTURE: CPT | Performed by: NURSE PRACTITIONER

## 2024-12-03 RX ORDER — AMOXICILLIN 500 MG
1200 CAPSULE ORAL DAILY
COMMUNITY

## 2024-12-03 NOTE — PATIENT INSTRUCTIONS
Patient Education   Preventive Care Advice   This is general advice given by our system to help you stay healthy. However, your care team may have specific advice just for you. Please talk to your care team about your preventive care needs.  Nutrition  Eat 5 or more servings of fruits and vegetables each day.  Try wheat bread, brown rice and whole grain pasta (instead of white bread, rice, and pasta).  Get enough calcium and vitamin D. Check the label on foods and aim for 100% of the RDA (recommended daily allowance).  Lifestyle  Exercise at least 150 minutes each week  (30 minutes a day, 5 days a week).  Do muscle strengthening activities 2 days a week. These help control your weight and prevent disease.  No smoking.  Wear sunscreen to prevent skin cancer.  Have a dental exam and cleaning every 6 months.  Yearly exams  See your health care team every year to talk about:  Any changes in your health.  Any medicines your care team has prescribed.  Preventive care, family planning, and ways to prevent chronic diseases.  Shots (vaccines)   HPV shots (up to age 26), if you've never had them before.  Hepatitis B shots (up to age 59), if you've never had them before.  COVID-19 shot: Get this shot when it's due.  Flu shot: Get a flu shot every year.  Tetanus shot: Get a tetanus shot every 10 years.  Pneumococcal, hepatitis A, and RSV shots: Ask your care team if you need these based on your risk.  Shingles shot (for age 50 and up)  General health tests  Diabetes screening:  Starting at age 35, Get screened for diabetes at least every 3 years.  If you are younger than age 35, ask your care team if you should be screened for diabetes.  Cholesterol test: At age 39, start having a cholesterol test every 5 years, or more often if advised.  Bone density scan (DEXA): At age 50, ask your care team if you should have this scan for osteoporosis (brittle bones).  Hepatitis C: Get tested at least once in your life.  STIs (sexually  transmitted infections)  Before age 24: Ask your care team if you should be screened for STIs.  After age 24: Get screened for STIs if you're at risk. You are at risk for STIs (including HIV) if:  You are sexually active with more than one person.  You don't use condoms every time.  You or a partner was diagnosed with a sexually transmitted infection.  If you are at risk for HIV, ask about PrEP medicine to prevent HIV.  Get tested for HIV at least once in your life, whether you are at risk for HIV or not.  Cancer screening tests  Cervical cancer screening: If you have a cervix, begin getting regular cervical cancer screening tests starting at age 21.  Breast cancer scan (mammogram): If you've ever had breasts, begin having regular mammograms starting at age 40. This is a scan to check for breast cancer.  Colon cancer screening: It is important to start screening for colon cancer at age 45.  Have a colonoscopy test every 10 years (or more often if you're at risk) Or, ask your provider about stool tests like a FIT test every year or Cologuard test every 3 years.  To learn more about your testing options, visit:   .  For help making a decision, visit:   https://bit.ly/vo69842.  Prostate cancer screening test: If you have a prostate, ask your care team if a prostate cancer screening test (PSA) at age 55 is right for you.  Lung cancer screening: If you are a current or former smoker ages 50 to 80, ask your care team if ongoing lung cancer screenings are right for you.  For informational purposes only. Not to replace the advice of your health care provider. Copyright   2023 [facility]. All rights reserved. Clinically reviewed by the [facility]. [employer] 682159 - REV 01/24.  9 Ways to Cut Back on Drinking  Maybe you've found yourself drinking more alcohol than you'd prefer. If you want to cut back, here are some ideas to try.    Think before you drink.  Do you really want a drink,  "or is it just a habit? If you're used to having a drink at a certain time, try doing something else then.     Look for substitutes.  Find some no-alcohol drinks that you enjoy, like flavored seltzer water, tea with honey, or tonic with a slice of lime. Or try alcohol-free beer or \"virgin\" cocktails (without the alcohol).     Drink more water.  Use water to quench your thirst. Drink a glass of water before you have any alcohol. Have another glass along with every drink or between drinks.     Shrink your drink.  For example, have a bottle of beer instead of a pint. Use a smaller glass for wine. Choose drinks with lower alcohol content (ABV%). Or use less liquor and more mixer in cocktails.     Slow down.  It's easy to drink quickly and without thinking about it. Pay attention, and make each drink last longer.     Do the math.  Total up how much you spend on alcohol each month. How much is that a year? If you cut back, what could you do with the money you save?     Take a break.  Choose a day or two each week when you won't drink at all. Notice how you feel on those days, physically and emotionally. How did you sleep? Do you feel better? Over time, add more break days.     Count calories.  Would you like to lose some weight? For some people that's a good motivator for cutting back. Figure out how many calories are in each drink. How many does that add up to in a day? In a week? In a month?     Practice saying no.  Be ready when someone offers you a drink. Try: \"Thanks, I've had enough.\" Or \"Thanks, but I'm cutting back.\" Or \"No, thanks. I feel better when I drink less.\"   Current as of: November 15, 2023  Content Version: 14.2 2024 [employer].   Care instructions adapted under license by your healthcare professional. If you have questions about a medical condition or this instruction, always ask your healthcare professional. [employer] disclaims any warranty or liability for your use of " this information.

## 2024-12-03 NOTE — PROGRESS NOTES
"Preventive Care Visit  [facility]  CHAS Goodwin CNP, Nurse Practitioner - Family  Dec 3, 2024    Assessment & Plan     Routine general medical examination at a health care facility  Wellness exam completed today.    Labs done through work; they do everything except thyroid we will check that today to ensure no thyroid condition contributing to his symptoms.  Will notify of lab results.   - PRIMARY CARE FOLLOW-UP SCHEDULING    Class 1 obesity due to excess calories with serious comorbidity and body mass index (BMI) of 31.0 to 31.9 in adult  Interestingly his work form has (passing for distribution of financial incentive)of LDL if not less than 100 and BMI 24.9 or below.  Felt this is unrealistic expectations for almost anybody.  Happy to write appeal for his work.    Recommend the goal of weight loss of at least 5% in the next 3 months.  Recommend to work on your diet/Mediterranean diet and take fish oil routinely to bring down his triglycerides.    Hyperlipidemia with target LDL less than 160    - TSH WITH FREE T4 REFLEX  - TSH WITH FREE T4 REFLEX    Screening for thyroid disorder    - TSH WITH FREE T4 REFLEX  - TSH WITH FREE T4 REFLEX    Hypertriglyceridemia    - Omega-3 Fatty Acids (FISH OIL) 1200 MG capsule    Need for COVID-19 vaccine    - COVID-19 12+ (PFIZER)      BMI  Estimated body mass index is 31.78 kg/m  as calculated from the following:    Height as of this encounter: 1.843 m (6' 0.56\").    Weight as of this encounter: 108 kg (238 lb).   Weight management plan: Discussed healthy diet and exercise guidelines      Return in about 3 months (around 3/3/2025) for Recheck.     Subjective   Sanjeev is a 34 year old, presenting for the following:  Physical        12/3/2024     8:58 AM   Additional Questions   Roomed by Cornel HALL   Accompanied by Self          HPI    Needs appeal letter written - LDL and BMI was higher than they are ok with - missing out on $950.    Fish oil has not " taken in awhile.       Has a new son named Roger 3 months old at home.  Him and his wife are eating out of convenience and understandably tired with a new infant at home    Wt Readings from Last 5 Encounters:   12/03/24 108 kg (238 lb)   12/01/23 103 kg (227 lb)   02/10/23 102.5 kg (226 lb)   11/29/22 107.5 kg (237 lb)   08/21/19 88.9 kg (196 lb)       Health Care Directive  Patient does not have a Health Care Directive: Discussed advance care planning with patient; however, patient declined at this time.      11/30/2024   General Health   How would you rate your overall physical health? (!) FAIR   Feel stress (tense, anxious, or unable to sleep) Not at all            11/30/2024   Nutrition   Three or more servings of calcium each day? Yes   Diet: Regular (no restrictions)    Breakfast skipped   How many servings of fruit and vegetables per day? 4 or more   How many sweetened beverages each day? 0-1       Multiple values from one day are sorted in reverse-chronological order         11/30/2024   Exercise   Days per week of moderate/strenous exercise 3 days   Average minutes spent exercising at this level 40 min            11/30/2024   Social Factors   Frequency of gathering with friends or relatives Twice a week   Worry food won't last until get money to buy more No   Food not last or not have enough money for food? No   Do you have housing? (Housing is defined as stable permanent housing and does not include staying ouside in a car, in a tent, in an abandoned building, in an overnight shelter, or couch-surfing.) No   Are you worried about losing your housing? No   Lack of transportation? No   Unable to get utilities (heat,electricity)? No   Want help with housing or utility concern? No      (!) HOUSING CONCERN PRESENT      11/30/2024   Dental   Dentist two times every year? Yes            11/30/2024   TB Screening   Were you born outside of the [place]? No            Today's PHQ-2 Score:       12/3/2024     8:39 AM    PHQ-2 ( 1999 [employer])   Q1: Little interest or pleasure in doing things 0    Q2: Feeling down, depressed or hopeless 0    PHQ-2 Score 0    Q1: Little interest or pleasure in doing things Not at all   Q2: Feeling down, depressed or hopeless Not at all   PHQ-2 Score 0       Patient-reported           11/30/2024   Substance Use   Alcohol more than 3/day or more than 7/wk Yes   How often do you have a drink containing alcohol 2 to 3 times a week   How many alcohol drinks on typical day 3 or 4   How often do you have 5+ drinks at one occasion Less than monthly   Audit 2/3 Score 2   How often not able to stop drinking once started Never   How often failed to do what normally expected Never   How often needed first drink in am after a heavy drinking session Never   How often feeling of guilt or remorse after drinking Less than monthly   How often unable to remember what happened the night before Never   Have you or someone else been injured because of your drinking No   Has anyone been concerned or suggested you cut down on drinking No   TOTAL SCORE - AUDIT 6   Do you use any other substances recreationally? No        Social History     Tobacco Use    Smoking status: Never    Smokeless tobacco: Never   Vaping Use    Vaping status: Never Used   Substance Use Topics    Alcohol use: Yes    Drug use: Not Currently           11/30/2024   One time HIV Screening   Previous HIV test? I don't know          11/30/2024   STI Screening   New sexual partner(s) since last STI/HIV test? No            11/30/2024   Contraception/Family Planning   Questions about contraception or family planning No      Reviewed and updated as needed this visit by Provider   Tobacco  Allergies  Meds  Problems  Med Hx  Surg Hx  Fam Hx              Constitutional, HEENT, cardiovascular, pulmonary, GI, , musculoskeletal, neuro, skin, endocrine and psych systems are negative, except as otherwise noted in the HPI.        Objective    Exam  /72  "(BP Location: Left arm, Patient Position: Chair, Cuff Size: Adult Large)   Pulse 60   Temp 97.4  F (36.3  C) (Tympanic)   Resp 14   Ht 1.843 m (6' 0.56\")   Wt 108 kg (238 lb)   SpO2 96%   BMI 31.78 kg/m     Estimated body mass index is 31.78 kg/m  as calculated from the following:    Height as of this encounter: 1.843 m (6' 0.56\").    Weight as of this encounter: 108 kg (238 lb).    Physical Exam  GENERAL: alert and no distress  EYES: Eyes grossly normal to inspection, PERRL and conjunctivae and sclerae normal  HENT: ear canals and TM's normal, nose and mouth without ulcers or lesions  NECK: no adenopathy, no asymmetry, masses, or scars  RESP: lungs clear to auscultation - no rales, rhonchi or wheezes  CV: regular rate and rhythm, normal S1 S2, no S3 or S4, no murmur, click or rub, no peripheral edema  ABDOMEN: soft, nontender, no hepatosplenomegaly, no masses and bowel sounds normal  MS: no gross musculoskeletal defects noted, no edema  SKIN: no suspicious lesions or rashes  NEURO: Normal strength and tone, mentation intact and speech normal  PSYCH: mentation appears normal, affect normal/bright         Signed Electronically by: CHAS Goodwin CNP    "

## 2024-12-04 LAB — TSH SERPL DL<=0.005 MIU/L-ACNC: 1.61 UIU/ML (ref 0.3–4.2)

## 2024-12-04 NOTE — RESULT ENCOUNTER NOTE
Dear Sanjeev,    Here is a summary of your recent test results:    -TSH (thyroid stimulating hormone) level is normal which indicates normal thyroid function.    For additional lab test information, labtestsonline.org is an excellent reference.    In addition, here is a list of due or overdue Health Maintenance reminders:    There are no preventive care reminders to display for this patient.    Please call us at [phone] (or use [employer]) to address the above recommendations if needed.    Thank you for choosing [facility].  It was an honor and a privilege to participate in your care.       Healthy regards,    Calista Olivares, GREGP  [facility]

## 2025-01-28 ENCOUNTER — OFFICE VISIT (OUTPATIENT)
Dept: PODIATRY | Facility: CLINIC | Age: 35
End: 2025-01-28
Payer: COMMERCIAL

## 2025-01-28 ENCOUNTER — ANCILLARY PROCEDURE (OUTPATIENT)
Dept: GENERAL RADIOLOGY | Facility: CLINIC | Age: 35
End: 2025-01-28
Attending: PODIATRIST
Payer: COMMERCIAL

## 2025-01-28 VITALS — DIASTOLIC BLOOD PRESSURE: 80 MMHG | BODY MASS INDEX: 31.78 KG/M2 | SYSTOLIC BLOOD PRESSURE: 125 MMHG | WEIGHT: 238 LBS

## 2025-01-28 DIAGNOSIS — M79.671 RIGHT FOOT PAIN: ICD-10-CM

## 2025-01-28 DIAGNOSIS — M25.571 ARTHRALGIA OF RIGHT FOOT: Primary | ICD-10-CM

## 2025-01-28 LAB — WBC # BLD AUTO: 6.1 10E3/UL (ref 4–11)

## 2025-01-28 PROCEDURE — 99203 OFFICE O/P NEW LOW 30 MIN: CPT | Performed by: PODIATRIST

## 2025-01-28 PROCEDURE — 85048 AUTOMATED LEUKOCYTE COUNT: CPT | Performed by: PODIATRIST

## 2025-01-28 PROCEDURE — 73630 X-RAY EXAM OF FOOT: CPT | Mod: TC | Performed by: RADIOLOGY

## 2025-01-28 PROCEDURE — 36415 COLL VENOUS BLD VENIPUNCTURE: CPT | Performed by: PODIATRIST

## 2025-01-28 PROCEDURE — 84550 ASSAY OF BLOOD/URIC ACID: CPT | Performed by: PODIATRIST

## 2025-01-28 NOTE — LETTER
1/28/2025      Guicho Baird  [address] [place] [address]      Dear Colleague,    Thank you for referring your patient, Guicho Baird, to the [facility]. Please see a copy of my visit note below.    ASSESSMENT:  Encounter Diagnoses   Name Primary?     Arthralgia of right foot Yes     Right foot pain      MEDICAL DECISION MAKING:  The differential diagnosis includes right third metatarsal capsulitis/metatarsalgia, stress reaction/stress fracture of the third metatarsal, gout or other form of crystalline arthropathy.    I personally reviewed the right foot x-ray images with Sanjeev.  No acute findings.  Specifically, no periosteal reactions, no fractures, no joint effusion.    Recommendations:  PRICE therapy reviewed  Ibuprofen, Tylenol, cold application  Stiffer soled shoes off of the forefoot during the propulsive phase of gait  Avoidance of barefoot walking  Good arch support  Metatarsal padding  Relative rest    Will await the uric acid findings    Disclaimer: This note consists of symbols derived from keyboarding, dictation and/or voice recognition software. As a result, there may be errors in the script that have gone undetected. Please consider this when interpreting information found in this chart.    Aleksandr Ramos DPM, FACFAS, [facility] Department of Podiatry/Foot & Ankle Surgery      ____________________________________________________________________    HPI:       Guicho Baird presents today reporting right foot pain.  This is centralized under the plantar forefoot.  He first noticed this several weeks ago after playing pickle ball.  It resolved and now has returned more painful.  Burning, shooting rated a 7 out of 10  Comes and goes  Pain is associated with weightbearing activities  He wear shoes indoors.  He takes ibuprofen when needed  Exercise activities include stationary biking, dog walking, pickleball, bowling, golf, running, basketball    *No  past medical history on file.*  *No past surgical history on file.*  *  Current Outpatient Medications   Medication Sig Dispense Refill     Omega-3 Fatty Acids (FISH OIL) 1200 MG capsule Take 1,200 mg by mouth daily.           EXAM:    Vitals: There were no vitals taken for this visit.  BMI: There is no height or weight on file to calculate BMI.    Vasc:      Pedal pulses are palpable for the dorsalis pedis posterior tibial artery, bilateral foot.  Capillary fill time </= 3 seconds  Pedal skin appears well-perfused  Neuro:      Light touch sensation intact to all sensory nerve distributions, bilateral foot.  No apparent spastic contractures or other deformity secondary to neurologic compromise.  Derm:      No calluses  No wounds   No worrisome lesions  MSK:      Melissa palpation to the plantar aspect of the right third metatarsal phalangeal joint.  Painful range of motion of this joint and pain on dorsal palpation.  There is some mild dorsal swelling in this region.  Bilateral lower extremity muscle strength presents is normal.  No gross deformities  Adequate ankle and subtalar joint range of motion  Calf:    Neg for redness, swelling or tenderness    X-Ray Findings:  I personally reviewed the right foot images.  See comments above.          Again, thank you for allowing me to participate in the care of your patient.        Sincerely,        Aleksandr Ramos DPM    Electronically signed

## 2025-01-28 NOTE — PATIENT INSTRUCTIONS
Thank you for choosing [facility] Podiatry / Foot & Ankle Surgery!    DR. OTT'S CLINIC LOCATIONS:     [facility] TRIAGE LINE: [phone]   [address] [place] [address] APPOINTMENTS: [phone]   [place] [address] RADIOLOGY: [phone]   (Every other Tues - Wed - Fri PM) SET UP SURGERY: [phone]    PHYSICAL THERAPY: [phone]   [facility] BILLING QUESTIONS: [phone] [address] FAX: [phone]   [place] [address]    (Thurs & Fri AM)         CAPSULITIS / METATARSALGIA  All joints in the body are surrounded by a capsule, or a covering of soft tissue and ligaments. The capsule holds bones together and secretes joint fluid to help lubricate the joint. If a joint capsule is exposed to excessive force, it can develop microscopic tears and become inflamed. This commonly occurs in the foot due to mild variation in anatomy. Hammertoes, bunions, irregular bone length, joint immobility, etc. can all lead to excessive force on the joint. Capsule injury can also occur due to repetitive stress from exercise, insufficient support from shoes, excessive bare foot walking and excessive weight.      Conservative treatments include ice, rest from the aggravating activity, weight loss, orthotic inserts, improving shoes and shoe modifications. You can purchase an over the counter felt metatarsal pad to place in your shoes at [facility] or on [facility]. Appropriate shoes will protect the inflamed tissue improving the chances of healing. Avoidance of standing or walking barefoot, including around the house, is necessary to allow healing. Casts are sometimes used for more aggressive protection.  NSAIDs such as Advil are also used to help with pain and decreasing inflammation. If pain continues over a period of weeks with continuous rest and icing, Corticosteroid injections can be a treatment option to try and help decrease inflammation.    Surgery is often necessary to correct the underlying  "structural problem. Surgery might include shortening an excessively long bone, repairing bunion or hammertoe, lengthening a tight Achilles  tendon, etc. These are same day surgeries that might be pursued if more conservative measures fail to provide relief.      The inflamed joint capsule has the potential to completely tear. This will allow the toe to drift off the ground, curving toward the other toes. The involved toe may under or overlap the adjacent toes as drift continues. The pain may improve after the joint tears or this new position will be permanent. Surgery can address the toe alignment. Your goal of treating capsulitis is to avoid this scenario.        ** You can find felt metatarsal pads to place in your shoes at our [facility], [email or web address], [email or web address], or on [email or web address]     Dr. Ramos likes the Hapad brand.    FOREFOOT PAIN RECOMMENDATIONS    1) Please consider stiffer/ rigid - soled shoes as much as possible. These take stress off of the ball of your foot. This might be short term, until pain resolves, or long term to keep pain controlled.    2) Avoid barefoot walking, walking in socks, flexible shoes, flip flops, shoes without arch support, etc.    3) It is a good idea to wear a shoe at all times, including when at home.    4) Consider purchasing a felt metatarsal pad.  A good brand is \"Hapad.\"  You can find these and others online.  Also, these can be built into custom/prescription arch supports.    5) Consider a quality over-the-counter arch support. These can be found at [employer].  Some of these have a metatarsal pad built in.   If Dr. Ramos prescribed custom orthoses, please consider this option.    6) Ice and anti inflammatories can be helpful, earlier on in the process.  Anti inflammatories are not good for you, if taken for a long period of time.     7) Gentle calf stretching can take pressure off of the ball of your foot.    Calf/Achilles Stretching: Do the stretching " gently. Do not bounce or stretch to the point of pain. Hold each stretch for 30 seconds. Stretch 10 times per set, three sets per day. Morning, afternoon and evening. If your heel pain is very severe in the morning, consider doing the first set of stretches before you get out of bed.               Hold each stretch for 30 seconds. Stretch 10 times per set, three sets per day. Morning, afternoon and evening. If your heel pain is very severe in the morning, consider doing the first set of stretches before you get out of bed.        PRICE THERAPY  Many aches and pains throughout the foot and ankle can be helped with many simple treatments. This is usually described as PRICE Therapy.      P - Protection - often times, inflammation/pain in the lower extremity is not able to improve simply because the areas involved are never allowed to rest. Every step we take can bother the problematic area. Protecting those areas is an important step in the healing process. This may involve a walking cast boot, a special insert/orthotic device, an ankle brace, or simply avoiding barefoot walking.    R - Rest - in addition to protecting the foot/ankle, resting is an important, but often times difficult, treatment option. Getting off your feet when they bother you, and specifically avoiding activities that cause pain/discomfort, are very beneficial to prevent, and treat, foot/ankle pain.      I - Ice - icing regularly can help to decrease inflammation and swelling in the foot, thus decreasing pain. Using an ice pack or a bag of frozen veggies works very well. Ice for 20 minutes multiple times per day as needed.  Do not place the ice directly on the skin as this can cause tissue damage.    C - Compression - using a compression wrap or an ACE wrap can help to decrease swelling, which can help to decrease pain. Wearing the wraps is generally not needed at night, but they should be worn on a regular basis when you are going to be on your  feet for prolonged periods as gravity tends to pull fluids down to your feet/ankles.    E - Elevation - elevating your lower extremities multiple times daily for 15-20 minutes can help to decrease swelling, which works well in decreasing pain levels.    NSAID/Tylenol - Anti-inflammatories like Aleve or ibuprofen, and/or a pain medication, such as Tylenol, can help to improve pain levels and get the issue resolved sooner rather than later. Anyone with liver issues should be careful with Tylenol, and anyone with high blood pressure or heart, stomach or kidney issues should be careful with anti-inflammatories. Please ask if you have questions about these medications, including dosage.

## 2025-01-28 NOTE — PROGRESS NOTES
ASSESSMENT:  Encounter Diagnoses   Name Primary?    Arthralgia of right foot Yes    Right foot pain      MEDICAL DECISION MAKING:  The differential diagnosis includes right third metatarsal capsulitis/metatarsalgia, stress reaction/stress fracture of the third metatarsal, gout or other form of crystalline arthropathy.    I personally reviewed the right foot x-ray images with Sanjeev.  No acute findings.  Specifically, no periosteal reactions, no fractures, no joint effusion.    Recommendations:  PRICE therapy reviewed  Ibuprofen, Tylenol, cold application  Stiffer soled shoes off of the forefoot during the propulsive phase of gait  Avoidance of barefoot walking  Good arch support  Metatarsal padding  Relative rest    Will await the uric acid findings    Disclaimer: This note consists of symbols derived from keyboarding, dictation and/or voice recognition software. As a result, there may be errors in the script that have gone undetected. Please consider this when interpreting information found in this chart.    Aleksandr Ramos DPM, FACFAS, MS    [facility]      ____________________________________________________________________    HPI:       Guicho Baird presents today reporting right foot pain.  This is centralized under the plantar forefoot.  He first noticed this several weeks ago after playing pickle ball.  It resolved and now has returned more painful.  Burning, shooting rated a 7 out of 10  Comes and goes  Pain is associated with weightbearing activities  He wear shoes indoors.  He takes ibuprofen when needed  Exercise activities include stationary biking, dog walking, pickleball, bowling, golf, running, basketball    *No past medical history on file.*  *No past surgical history on file.*  *  Current Outpatient Medications   Medication Sig Dispense Refill    Omega-3 Fatty Acids (FISH OIL) 1200 MG capsule Take 1,200 mg by mouth daily.           EXAM:    Vitals: There  were no vitals taken for this visit.  BMI: There is no height or weight on file to calculate BMI.    Vasc:      Pedal pulses are palpable for the dorsalis pedis posterior tibial artery, bilateral foot.  Capillary fill time </= 3 seconds  Pedal skin appears well-perfused  Neuro:      Light touch sensation intact to all sensory nerve distributions, bilateral foot.  No apparent spastic contractures or other deformity secondary to neurologic compromise.  Derm:      No calluses  No wounds   No worrisome lesions  MSK:      Melissa palpation to the plantar aspect of the right third metatarsal phalangeal joint.  Painful range of motion of this joint and pain on dorsal palpation.  There is some mild dorsal swelling in this region.  Bilateral lower extremity muscle strength presents is normal.  No gross deformities  Adequate ankle and subtalar joint range of motion  Calf:    Neg for redness, swelling or tenderness    X-Ray Findings:  I personally reviewed the right foot images.  See comments above.

## 2025-01-29 LAB — URATE SERPL-MCNC: 5.8 MG/DL (ref 3.4–7)
